# Patient Record
Sex: MALE | URBAN - METROPOLITAN AREA
[De-identification: names, ages, dates, MRNs, and addresses within clinical notes are randomized per-mention and may not be internally consistent; named-entity substitution may affect disease eponyms.]

---

## 2021-11-15 ENCOUNTER — TRANSFERRED RECORDS (OUTPATIENT)
Dept: HEALTH INFORMATION MANAGEMENT | Facility: CLINIC | Age: 36
End: 2021-11-15
Payer: COMMERCIAL

## 2021-11-17 ENCOUNTER — OFFICE VISIT (OUTPATIENT)
Dept: ORTHOPEDICS | Facility: CLINIC | Age: 36
End: 2021-11-17
Payer: COMMERCIAL

## 2021-11-17 ENCOUNTER — ANCILLARY PROCEDURE (OUTPATIENT)
Dept: GENERAL RADIOLOGY | Facility: CLINIC | Age: 36
End: 2021-11-17
Attending: FAMILY MEDICINE
Payer: COMMERCIAL

## 2021-11-17 DIAGNOSIS — M79.644 FINGER PAIN, RIGHT: ICD-10-CM

## 2021-11-17 DIAGNOSIS — M79.644 FINGER PAIN, RIGHT: Primary | ICD-10-CM

## 2021-11-17 PROCEDURE — 73140 X-RAY EXAM OF FINGER(S): CPT | Mod: RT | Performed by: RADIOLOGY

## 2021-11-17 PROCEDURE — 99203 OFFICE O/P NEW LOW 30 MIN: CPT | Performed by: FAMILY MEDICINE

## 2021-11-17 NOTE — LETTER
11/17/2021         RE: Sung Todd  [address]  [facility] [address]        Dear Colleague,    Thank you for referring your patient, Sung Todd, to the [facility] SPORTS MEDICINE [facility]. Please see a copy of my visit note below.      [facility]  SPORTS MEDICINE CLINIC VISIT     Nov 17, 2021        ASSESSMENT & PLAN    36-year-old with injury to the PIP of the right small finger.  Unlikely to be fracture after imaging today.  Suspect ligamentous sprain of the collateral ligaments.    Reviewed imaging and assessment with patient in detail  He can continue using the splint as necessary for comfort.  However he can transition to buddy taping at any time he feels comfortable.  Would recommend buddy taping for the next 2 weeks and then as needed thereafter.  Okay to continue with cricket as long as he feels comfortable playing while taped.    Rene Grimaldo MD  [facility]    -----  Chief Complaint   Patient presents with     Consult     right small finger, left shoulder pain        SUBJECTIVE  Sung Todd is a/an 36 year old male who is seen as a self referral for evaluation of  Right small finger pain.     The patient is seen by themselves.  The patient is Right handed    Onset: 3 days ago. Patient describes injury as a ball hitting his finger during a cricket game  Location of Pain: right small finger, PIP joint  Worsened by: use  Better with: rest, splinting  Treatments tried: casting/splinting/bracing  Associated symptoms: no distal numbness or tingling; denies swelling or warmth    He was seen in urgent care at [place] where x-rays were performed and he was told that he may have a fracture.  He is following up because he never heard back definitively whether or not he has a fracture and if he needs to continue to use a splint.      Orthopedic/Surgical history: NO  Social History/Occupation:        REVIEW  OF SYSTEMS:    Do you have fever, chills, weight loss? No    Do you have any vision problems? No    Do you have any chest pain or edema? No    Do you have any shortness of breath or wheezing?  No    Do you have stomach problems? No    Do you have any numbness or focal weakness? No    Do you have diabetes? No    Do you have problems with bleeding or clotting? No    Do you have an rashes or other skin lesions? No    OBJECTIVE:    General  - alert, pleasant, no distress  CV  - normal radial pulse, cap refill brisk  Musculoskeletal -right small finger  - inspection: There is mild swelling of the PIP joint and proximal phalanx.  No ecchymosis  - palpation: TTP over the radial and ulnar aspect of the PIP joint.  No TTP over the dorsal or volar aspect.    - ROM:  MCP 90 deg flexion   0 deg extension    deg flexion  0 deg extension, some pain with terminal flexion   DIP 80 deg flexion   0 deg extension  - strength: 5/5  strength, 5/5 wrist abduction, 5/5 flexion, extension, pronation, supination, adduction  - special tests:  Pain but no laxity with varus and valgus stress  Neuro  - no numbness, no motor deficit, grossly normal coordination, normal muscle tone  Skin  - no ecchymosis, erythema, warmth, or induration, no obvious rash      RADIOLOGY:    3 view xrays of right small finger performed and reviewed independently demonstrating no acute fracture or dislocation.  No DJD. See EMR for formal radiology report.             Again, thank you for allowing me to participate in the care of your patient.        Sincerely,        Rene Grimaldo MD

## 2021-11-17 NOTE — PROGRESS NOTES
[facility]  SPORTS MEDICINE CLINIC VISIT     Nov 17, 2021        ASSESSMENT & PLAN    36-year-old with injury to the PIP of the right small finger.  Unlikely to be fracture after imaging today.  Suspect ligamentous sprain of the collateral ligaments.    Reviewed imaging and assessment with patient in detail  He can continue using the splint as necessary for comfort.  However he can transition to buddy taping at any time he feels comfortable.  Would recommend buddy taping for the next 2 weeks and then as needed thereafter.  Okay to continue with cricket as long as he feels comfortable playing while taped.    Rene Grimaldo MD  [facility] SPORTS MEDICINE [facility]    -----  Chief Complaint   Patient presents with     Consult     right small finger, left shoulder pain        SUBJECTIVE  Alixbipinlisa Rianna Tdod is a/an 36 year old male who is seen as a self referral for evaluation of  Right small finger pain.     The patient is seen by themselves.  The patient is Right handed    Onset: 3 days ago. Patient describes injury as a ball hitting his finger during a cricket game  Location of Pain: right small finger, PIP joint  Worsened by: use  Better with: rest, splinting  Treatments tried: casting/splinting/bracing  Associated symptoms: no distal numbness or tingling; denies swelling or warmth    He was seen in urgent care at [place] where x-rays were performed and he was told that he may have a fracture.  He is following up because he never heard back definitively whether or not he has a fracture and if he needs to continue to use a splint.      Orthopedic/Surgical history: NO  Social History/Occupation:        REVIEW OF SYSTEMS:    Do you have fever, chills, weight loss? No    Do you have any vision problems? No    Do you have any chest pain or edema? No    Do you have any shortness of breath or wheezing?  No    Do you have stomach problems? No    Do you have any numbness or focal  weakness? No    Do you have diabetes? No    Do you have problems with bleeding or clotting? No    Do you have an rashes or other skin lesions? No    OBJECTIVE:    General  - alert, pleasant, no distress  CV  - normal radial pulse, cap refill brisk  Musculoskeletal -right small finger  - inspection: There is mild swelling of the PIP joint and proximal phalanx.  No ecchymosis  - palpation: TTP over the radial and ulnar aspect of the PIP joint.  No TTP over the dorsal or volar aspect.    - ROM:  MCP 90 deg flexion   0 deg extension    deg flexion  0 deg extension, some pain with terminal flexion   DIP 80 deg flexion   0 deg extension  - strength: 5/5  strength, 5/5 wrist abduction, 5/5 flexion, extension, pronation, supination, adduction  - special tests:  Pain but no laxity with varus and valgus stress  Neuro  - no numbness, no motor deficit, grossly normal coordination, normal muscle tone  Skin  - no ecchymosis, erythema, warmth, or induration, no obvious rash      RADIOLOGY:    3 view xrays of right small finger performed and reviewed independently demonstrating no acute fracture or dislocation.  No DJD. See EMR for formal radiology report.

## 2021-12-15 ENCOUNTER — OFFICE VISIT (OUTPATIENT)
Dept: ORTHOPEDICS | Facility: CLINIC | Age: 36
End: 2021-12-15
Payer: COMMERCIAL

## 2021-12-15 DIAGNOSIS — M79.644 FINGER PAIN, RIGHT: Primary | ICD-10-CM

## 2021-12-15 PROCEDURE — 99213 OFFICE O/P EST LOW 20 MIN: CPT | Performed by: FAMILY MEDICINE

## 2021-12-15 NOTE — PROGRESS NOTES
[facility]  SPORTS MEDICINE CLINIC VISIT     Dec 15, 2021        ASSESSMENT & PLAN    36-year-old with injury to the PIP of the right small finger 1 month ago.  Suspected collateral ligament sprain.  Symptoms have nearly resolved except for persistent swelling.    Reviewed imaging and assessment with patient in detail  Discussed that he may have persistent swelling 4 months after injury such as he has sustained.  However, it is reassuring that his pain and function has returned.  He does not need any further treatment with splinting or buddy taping unless he feels necessary to do so for comfort.  Follow-up as needed..    Rene Grimaldo MD  [facility] SPORTS MEDICINE [facility]    -----  Chief Complaint   Patient presents with     RECHECK     right small finger swelling        SUBJECTIVE  Sung Todd is a/an 36 year old male who is seen for follow up of right small finger swelling.     The patient is seen by themselves.    Date of injury: 11/13/21  Date of Last Visit: 11/17/21   Symptoms: Pain is resolved.  Function has completely returned including range of motion.  Still having some swelling about the joint and wondering if this is normal      Orthopedic/Surgical history: NO  Social History/Occupation:        REVIEW OF SYSTEMS:    See HPI    OBJECTIVE:    General  - alert, pleasant, no distress  CV  - normal radial pulse, cap refill brisk  Musculoskeletal -right small finger  - inspection: There is mild swelling of the PIP joint and proximal phalanx.  No ecchymosis  - palpation: No TTP of the right small finger  - ROM:  MCP 90 deg flexion   0 deg extension    deg flexion  0 deg extension,    DIP 80 deg flexion   0 deg extension  No pain with range of motion testing  - strength: 5/5  strength, 5/5 wrist abduction, 5/5 flexion, extension, pronation, supination, adduction  - special tests:  No pain or laxity with varus and valgus stress  Neuro  - no  numbness, no motor deficit, grossly normal coordination, normal muscle tone  Skin  - no ecchymosis, erythema, warmth, or induration, no obvious rash      RADIOLOGY:    No new imaging this visit

## 2021-12-15 NOTE — LETTER
12/15/2021         RE: Sung Todd  [address]  [facility] [address]        Dear Colleague,    Thank you for referring your patient, Sung Todd, to the [facility] SPORTS [facility]. Please see a copy of my visit note below.        [facility]  SPORTS MEDICINE CLINIC VISIT     Dec 15, 2021        ASSESSMENT & PLAN    36-year-old with injury to the PIP of the right small finger 1 month ago.  Suspected collateral ligament sprain.  Symptoms have nearly resolved except for persistent swelling.    Reviewed imaging and assessment with patient in detail  Discussed that he may have persistent swelling 4 months after injury such as he has sustained.  However, it is reassuring that his pain and function has returned.  He does not need any further treatment with splinting or buddy taping unless he feels necessary to do so for comfort.  Follow-up as needed..    Rene Griamldo MD  [facility]    -----  Chief Complaint   Patient presents with     RECHECK     right small finger swelling        SUBJECTIVE  Sung Todd is a/an 36 year old male who is seen for follow up of right small finger swelling.     The patient is seen by themselves.    Date of injury: 11/13/21  Date of Last Visit: 11/17/21   Symptoms: Pain is resolved.  Function has completely returned including range of motion.  Still having some swelling about the joint and wondering if this is normal      Orthopedic/Surgical history: NO  Social History/Occupation:        REVIEW OF SYSTEMS:    See HPI    OBJECTIVE:    General  - alert, pleasant, no distress  CV  - normal radial pulse, cap refill brisk  Musculoskeletal -right small finger  - inspection: There is mild swelling of the PIP joint and proximal phalanx.  No ecchymosis  - palpation: No TTP of the right small finger  - ROM:  MCP 90 deg flexion   0 deg extension    deg flexion  0 deg extension,     DIP 80 deg flexion   0 deg extension  No pain with range of motion testing  - strength: 5/5  strength, 5/5 wrist abduction, 5/5 flexion, extension, pronation, supination, adduction  - special tests:  No pain or laxity with varus and valgus stress  Neuro  - no numbness, no motor deficit, grossly normal coordination, normal muscle tone  Skin  - no ecchymosis, erythema, warmth, or induration, no obvious rash      RADIOLOGY:    No new imaging this visit            Again, thank you for allowing me to participate in the care of your patient.        Sincerely,        Rene Grimaldo MD

## 2022-02-06 ENCOUNTER — HEALTH MAINTENANCE LETTER (OUTPATIENT)
Age: 37
End: 2022-02-06

## 2022-10-03 ENCOUNTER — HEALTH MAINTENANCE LETTER (OUTPATIENT)
Age: 37
End: 2022-10-03

## 2023-02-11 ENCOUNTER — HEALTH MAINTENANCE LETTER (OUTPATIENT)
Age: 38
End: 2023-02-11

## 2024-03-09 ENCOUNTER — HEALTH MAINTENANCE LETTER (OUTPATIENT)
Age: 39
End: 2024-03-09